# Patient Record
Sex: FEMALE | Race: BLACK OR AFRICAN AMERICAN | NOT HISPANIC OR LATINO | Employment: UNEMPLOYED | URBAN - METROPOLITAN AREA
[De-identification: names, ages, dates, MRNs, and addresses within clinical notes are randomized per-mention and may not be internally consistent; named-entity substitution may affect disease eponyms.]

---

## 2025-02-25 ENCOUNTER — APPOINTMENT (OUTPATIENT)
Dept: RADIOLOGY | Facility: HOSPITAL | Age: 45
End: 2025-02-25
Payer: COMMERCIAL

## 2025-02-25 ENCOUNTER — HOSPITAL ENCOUNTER (EMERGENCY)
Facility: HOSPITAL | Age: 45
Discharge: HOME | End: 2025-02-25
Attending: STUDENT IN AN ORGANIZED HEALTH CARE EDUCATION/TRAINING PROGRAM
Payer: COMMERCIAL

## 2025-02-25 VITALS
DIASTOLIC BLOOD PRESSURE: 75 MMHG | HEART RATE: 74 BPM | TEMPERATURE: 98.6 F | SYSTOLIC BLOOD PRESSURE: 120 MMHG | WEIGHT: 180 LBS | RESPIRATION RATE: 20 BRPM | OXYGEN SATURATION: 100 %

## 2025-02-25 DIAGNOSIS — R10.84 GENERALIZED ABDOMINAL PAIN: Primary | ICD-10-CM

## 2025-02-25 DIAGNOSIS — K52.9 GASTROENTERITIS: ICD-10-CM

## 2025-02-25 LAB
ALBUMIN SERPL BCP-MCNC: 4.9 G/DL (ref 3.4–5)
ALP SERPL-CCNC: 31 U/L (ref 33–110)
ALT SERPL W P-5'-P-CCNC: 13 U/L (ref 7–45)
ANION GAP SERPL CALC-SCNC: 13 MMOL/L (ref 10–20)
AST SERPL W P-5'-P-CCNC: 21 U/L (ref 9–39)
BASOPHILS # BLD AUTO: 0.04 X10*3/UL (ref 0–0.1)
BASOPHILS NFR BLD AUTO: 0.6 %
BILIRUB SERPL-MCNC: 0.4 MG/DL (ref 0–1.2)
BUN SERPL-MCNC: 19 MG/DL (ref 6–23)
CALCIUM SERPL-MCNC: 9.1 MG/DL (ref 8.6–10.3)
CHLORIDE SERPL-SCNC: 103 MMOL/L (ref 98–107)
CO2 SERPL-SCNC: 23 MMOL/L (ref 21–32)
CREAT SERPL-MCNC: 1.55 MG/DL (ref 0.5–1.05)
EGFRCR SERPLBLD CKD-EPI 2021: 43 ML/MIN/1.73M*2
EOSINOPHIL # BLD AUTO: 0.09 X10*3/UL (ref 0–0.7)
EOSINOPHIL NFR BLD AUTO: 1.2 %
ERYTHROCYTE [DISTWIDTH] IN BLOOD BY AUTOMATED COUNT: 16.8 % (ref 11.5–14.5)
GLUCOSE SERPL-MCNC: 102 MG/DL (ref 74–99)
HCT VFR BLD AUTO: 32.3 % (ref 36–46)
HGB BLD-MCNC: 10.3 G/DL (ref 12–16)
IMM GRANULOCYTES # BLD AUTO: 0.03 X10*3/UL (ref 0–0.7)
IMM GRANULOCYTES NFR BLD AUTO: 0.4 % (ref 0–0.9)
LIPASE SERPL-CCNC: 10 U/L (ref 9–82)
LYMPHOCYTES # BLD AUTO: 1.24 X10*3/UL (ref 1.2–4.8)
LYMPHOCYTES NFR BLD AUTO: 17.1 %
MCH RBC QN AUTO: 25.7 PG (ref 26–34)
MCHC RBC AUTO-ENTMCNC: 31.9 G/DL (ref 32–36)
MCV RBC AUTO: 81 FL (ref 80–100)
MONOCYTES # BLD AUTO: 0.39 X10*3/UL (ref 0.1–1)
MONOCYTES NFR BLD AUTO: 5.4 %
NEUTROPHILS # BLD AUTO: 5.46 X10*3/UL (ref 1.2–7.7)
NEUTROPHILS NFR BLD AUTO: 75.3 %
NRBC BLD-RTO: 0 /100 WBCS (ref 0–0)
PLATELET # BLD AUTO: 350 X10*3/UL (ref 150–450)
POTASSIUM SERPL-SCNC: 3.7 MMOL/L (ref 3.5–5.3)
PREGNANCY TEST URINE, POC: NEGATIVE
PROT SERPL-MCNC: 7.8 G/DL (ref 6.4–8.2)
RBC # BLD AUTO: 4.01 X10*6/UL (ref 4–5.2)
SODIUM SERPL-SCNC: 135 MMOL/L (ref 136–145)
WBC # BLD AUTO: 7.3 X10*3/UL (ref 4.4–11.3)

## 2025-02-25 PROCEDURE — 99285 EMERGENCY DEPT VISIT HI MDM: CPT | Mod: 25 | Performed by: STUDENT IN AN ORGANIZED HEALTH CARE EDUCATION/TRAINING PROGRAM

## 2025-02-25 PROCEDURE — 83690 ASSAY OF LIPASE: CPT

## 2025-02-25 PROCEDURE — 36415 COLL VENOUS BLD VENIPUNCTURE: CPT

## 2025-02-25 PROCEDURE — 85025 COMPLETE CBC W/AUTO DIFF WBC: CPT

## 2025-02-25 PROCEDURE — 84075 ASSAY ALKALINE PHOSPHATASE: CPT

## 2025-02-25 PROCEDURE — 2500000001 HC RX 250 WO HCPCS SELF ADMINISTERED DRUGS (ALT 637 FOR MEDICARE OP)

## 2025-02-25 PROCEDURE — 74177 CT ABD & PELVIS W/CONTRAST: CPT | Performed by: STUDENT IN AN ORGANIZED HEALTH CARE EDUCATION/TRAINING PROGRAM

## 2025-02-25 PROCEDURE — 81025 URINE PREGNANCY TEST: CPT

## 2025-02-25 PROCEDURE — 2550000001 HC RX 255 CONTRASTS: Performed by: STUDENT IN AN ORGANIZED HEALTH CARE EDUCATION/TRAINING PROGRAM

## 2025-02-25 PROCEDURE — 93005 ELECTROCARDIOGRAM TRACING: CPT

## 2025-02-25 PROCEDURE — 74177 CT ABD & PELVIS W/CONTRAST: CPT

## 2025-02-25 RX ORDER — ACETAMINOPHEN 500 MG
1000 TABLET ORAL EVERY 6 HOURS PRN
Qty: 30 TABLET | Refills: 0 | Status: SHIPPED | OUTPATIENT
Start: 2025-02-25 | End: 2025-03-07

## 2025-02-25 RX ORDER — ACETAMINOPHEN 325 MG/1
975 TABLET ORAL ONCE
Status: COMPLETED | OUTPATIENT
Start: 2025-02-25 | End: 2025-02-25

## 2025-02-25 RX ORDER — PNV NO.153/FA/OM3/DHA/EPA/FISH 400-35-25
20 TABLET,CHEWABLE ORAL 3 TIMES DAILY PRN
Qty: 355 ML | Refills: 0 | Status: SHIPPED | OUTPATIENT
Start: 2025-02-25

## 2025-02-25 RX ORDER — ALUMINUM HYDROXIDE, MAGNESIUM HYDROXIDE, AND SIMETHICONE 1200; 120; 1200 MG/30ML; MG/30ML; MG/30ML
20 SUSPENSION ORAL ONCE
Status: COMPLETED | OUTPATIENT
Start: 2025-02-25 | End: 2025-02-25

## 2025-02-25 RX ORDER — POLYETHYLENE GLYCOL 3350 17 G/17G
17 POWDER, FOR SOLUTION ORAL DAILY
Qty: 7 PACKET | Refills: 0 | Status: SHIPPED | OUTPATIENT
Start: 2025-02-25 | End: 2025-03-04

## 2025-02-25 RX ORDER — CALCIUM CARBONATE 200(500)MG
1000 TABLET,CHEWABLE ORAL ONCE
Status: COMPLETED | OUTPATIENT
Start: 2025-02-25 | End: 2025-02-25

## 2025-02-25 RX ADMIN — CALCIUM CARBONATE (ANTACID) CHEW TAB 500 MG 1000 MG: 500 CHEW TAB at 10:18

## 2025-02-25 RX ADMIN — ALUMINUM HYDROXIDE, MAGNESIUM HYDROXIDE, AND DIMETHICONE 20 ML: 200; 20; 200 SUSPENSION ORAL at 11:52

## 2025-02-25 RX ADMIN — IOHEXOL 75 ML: 350 INJECTION, SOLUTION INTRAVENOUS at 12:55

## 2025-02-25 RX ADMIN — ACETAMINOPHEN 975 MG: 325 TABLET, FILM COATED ORAL at 10:19

## 2025-02-25 ASSESSMENT — COLUMBIA-SUICIDE SEVERITY RATING SCALE - C-SSRS
1. IN THE PAST MONTH, HAVE YOU WISHED YOU WERE DEAD OR WISHED YOU COULD GO TO SLEEP AND NOT WAKE UP?: NO
6. HAVE YOU EVER DONE ANYTHING, STARTED TO DO ANYTHING, OR PREPARED TO DO ANYTHING TO END YOUR LIFE?: NO
2. HAVE YOU ACTUALLY HAD ANY THOUGHTS OF KILLING YOURSELF?: NO

## 2025-02-25 NOTE — ED TRIAGE NOTES
"Patient arrives via EMS to waiting room with complaints of abdominal burning that started this morning, denies n/v/d. States \"nothing makes it better or worse,but tylenol would probably help\"  "

## 2025-02-25 NOTE — ED PROVIDER NOTES
History of Present Illness     History provided by: Patient  Limitations to History: None  External Records Reviewed with Brief Summary:  Previous visits to emergency departments for relatively benign complaints, does not appear to have any specific abdominal history    HPI:  Deisi Leija is a 44 y.o. female who denies any medical history but does appear to have had previous prescriptions for levothyroxine and was previously diagnosed with PE as well as diagnosis of major depressive disorder for which she took warfarin but is no longer on it.  Now only taking melatonin millikelvin.  Coming in today due to abdominal pain/burning which began around 4 PM yesterday.  Patient states that she ate a fast food meal then developed a burning sensation which she states migrates around her abdomen but has not crept into her chest.  No shortness of breath.  Initially denied nausea and vomiting but later described nausea.  No changes in bowel movements or urine.  No dysuria.  No other sick symptoms/fevers, malaise, cough.  Denies any sick contacts    Physical Exam   Triage vitals:  T 36.5 °C (97.7 °F)  HR 70  /69  RR 18  O2 99 % None (Room air)    Physical Exam  Constitutional:       General: She is not in acute distress.     Appearance: She is well-developed. She is not ill-appearing.   HENT:      Head: Normocephalic and atraumatic.      Mouth/Throat:      Mouth: Mucous membranes are moist.      Pharynx: Oropharynx is clear.   Eyes:      Extraocular Movements: Extraocular movements intact.      Pupils: Pupils are equal, round, and reactive to light.   Cardiovascular:      Rate and Rhythm: Normal rate and regular rhythm.      Heart sounds: Normal heart sounds. No murmur heard.     No friction rub. No gallop.   Pulmonary:      Effort: Pulmonary effort is normal. No respiratory distress.      Breath sounds: Normal breath sounds. No stridor. No wheezing, rhonchi or rales.   Chest:      Chest wall: No tenderness.    Abdominal:      General: Abdomen is protuberant.      Palpations: Abdomen is soft.      Tenderness: There is generalized abdominal tenderness. There is no right CVA tenderness, left CVA tenderness, guarding or rebound. Negative signs include Meyer's sign, McBurney's sign, psoas sign and obturator sign.      Hernia: No hernia is present.   Skin:     General: Skin is warm and dry.      Capillary Refill: Capillary refill takes less than 2 seconds.   Neurological:      Mental Status: She is alert and oriented to person, place, and time.      Comments: Ambulates normally   Appropriate strength and sensation in the BUEs and BLEs   Psychiatric:         Mood and Affect: Mood normal.         Behavior: Behavior normal.          Medical Decision Making & ED Course   Medical Decision Makin y.o. female with above history and physical notable for largely nonspecific abdominal pain without explicit concerns.  History sounds consistent with GERD and patient reported significant reduction in abdominal pain within 30 minutes of taking Tylenol and Tums.  Later administered Maalox which was initially well-tolerated but patient then reported that she felt worse and appeared anxious.  Patient stated that she was concerned that there was something very wrong at this point.  I suspect that this was largely induced by anxiety and her discomfort in her room as her room was uncomfortably warm.  Because of this now worse feeling despite no change in her examination I opted for contrasted CT of the abdomen and pelvis which showed no acute findings.  There were some gallstones without any signs of cholecystitis or gallstones being found in the neck of the gallbladder and therefore inconsistent with acute biliary obstruction or infection.  No other acute processes were found although on my personal review to it did appear that there was some mild stool burden.  I explained that there were no acute findings on her CT and that there were  no further concerns for emergent processes in her abdomen.  Patient then reported that she was concerned that she was constipated though describes having a bowel movement only a couple hours prior to initial presentation at the ED today.  I educated patient on this and that this is likely inconsistent with a diagnosis of constipation, but given the stool burden visible on her CT I did offer that MiraLAX may help with having some easier bowel movements since patient was describing having a partial but incomplete bowel movement.  Patient asked me to prescribe this as well as the other treatments that were used in the emergency department for continued therapy.  I avoided NSAID given concern that there may be some component of gastric acid/reflux causing her symptoms and I do not want to contribute to the acidity of her stomach by using NSAIDs and putting her at greater risk of gastric ulcer especially if this becomes a chronic complaint.  Ultimately prescribed MiraLAX, Tylenol, Maalox, and Tums to control patient's symptoms which are likely mediated by gastric reflux.  There is some possible but extremely mild concern for gastric ulcer.  At this point I do not feel that this is likely at this time.  Patient may also be suffering from the symptoms of some possible gallbladder symptoms from compression around stones but stone burden does not appear to be significant enough to be causing significant levels of pain and there are no signs of acute obstruction in the biliary system.    No other acute signs of emergent process in the abdomen including appendicitis as all physical exam maneuvers and CT were negative for signs of this, no vomiting.  Patient has not been having diarrhea or bloody bowel movements.  I do have some concern that there is potential for some component of irritable bowel disease which may be contributing to overall patient condition but no evidence of this on CT so will not pursue referral to GI at  this point.  Patient had good response to symptomatic treatments and will be discharged in stable condition.     ED Course:   ED Course as of 02/26/25 1503   Tue Feb 25, 2025   1054 EKG obtained today interpreted by me shows a normal sinus rhythm with a ventricular rate of 61, upright axis with generally low voltage throughout.  NY interval is borderline elongated and appears to be consistent with first-degree AV block, QRS is narrow and QTc normal. No ST changes or signs of STEMI.  There are some T wave inversions notable in V3 and V4 which are seen in previous EKGs from September and October of 2011. [CJ]   1222 Attending summary:  43 y/o F with PMHx provoked PE no longer on anticoag who is presenting for abdominal pain.  She reports that started yesterday at rest, was initially diffuse but now on the left side of her abdomen after she received Tylenol and Tums earlier this ED visit.  It is constant, associated with nausea but has been tolerating p.o.  No vomiting, diarrhea, urinary symptoms, chest pain, shortness of breath, fevers, chills.  No prior abdominal surgeries.  Never had symptoms like this before.  She did state that after the abdominal pain started, she urgently had to have a bowel movement and it slightly improved her symptoms. No vaginal bleeding or discharge. Currently on menstrual cycle.  Vitals unremarkable.  Exam shows a fatigued but nontoxic-appearing female who has a soft, nontender abdomen.      Prior to my evaluation, patient had labs and pregnancy test that showed no leukocytosis, CKD with creatinine at baseline, normal lipase, and negative pregnancy test on my independent review.  She has no risk factors for SBO and her abdominal exam is reassuring.  Furthermore, low suspicion for appendicitis, cholecystitis, diverticulitis based on history and exam.  She has no  symptoms very low concern for ovarian pathology or PID.  It is possible that this is gastroenteritis or IBS.  I discussed this  with the patient, engaged in shared decision making regarding CT abdomen pelvis stating that it is still possible we might not have an answer after that, and she would like to proceed with CT.  Will provide continued pain control.  Dispo pending CT results but I anticipate discharge. [SS]   1314 Lab workup is largely unremarkable, creatinine is at baseline [CJ]      ED Course User Index  [CJ] Santiago Tomlinson MD  [SS] Suzan Lloyd MD         Diagnoses as of 02/26/25 1503   Generalized abdominal pain   Gastroenteritis         ---     Social Determinants of Health which Significantly Impact Care: None identified     EKG Independent Interpretation: EKG interpreted by myself. Please see ED Course for full interpretation.    The patient was discussed with the following consultants/services: None    Independent Result Review and Interpretation: Relevant laboratory and radiographic results were reviewed and independently interpreted by myself.  As necessary, they are commented on in the ED Course.    Chronic conditions affecting the patient's care: As documented above in MDM    Care Considerations: As documented above in MDM    Disposition   As a result of the work-up, the patient was discharged home.  she was informed of her diagnosis and instructed to come back with any concerns or worsening of condition.  she and was agreeable to the plan as discussed above.  she was given the opportunity to ask questions.  All of the patient's questions were answered.    Procedures   Procedures    This was a shared visit with an ED attending.  The patient was seen and discussed with the ED attending    Santiago Tomlinson MD  Emergency Medicine     Santiago Tomlinson MD  Resident  02/26/25 [id number]

## 2025-02-25 NOTE — DISCHARGE INSTRUCTIONS
You are seen in the emergency room for abdominal pain.  You stated that your pain is started to get better after taking Tylenol and Tums the suggest that your symptoms are likely due to a condition called gastroenteritis which can be caused by several things including foods that did not agree with you and infections with viruses such as the flu.  In the meantime it is very important that you continue to stay hydrated with water and other fluids such as Gatorade.   It also appears that you have some gallstones which may cause some pain but no signs of these gallstones causing any blockages.     I recommend that you continue to treat your symptoms at home and use medications like Tylenol and Tums.  Also if you felt that the liquid you received felt that would be another good treatment, it is called Mylanta and can be bought at a pharmacy as can tylenol and Tums    I am sending prescriptions for Mylanta, Tylenol, and Tums as these seem to help you while you are here in the emergency room, I am also going to write some MiraLAX for you as you mentioned that you are having some difficulty having a bowel movement while you were here

## 2025-02-28 LAB
ATRIAL RATE: 61 BPM
P AXIS: 65 DEGREES
P OFFSET: 178 MS
P ONSET: 119 MS
PR INTERVAL: 210 MS
Q ONSET: 224 MS
QRS COUNT: 10 BEATS
QRS DURATION: 74 MS
QT INTERVAL: 402 MS
QTC CALCULATION(BAZETT): 404 MS
QTC FREDERICIA: 404 MS
R AXIS: -26 DEGREES
T AXIS: 30 DEGREES
T OFFSET: 425 MS
VENTRICULAR RATE: 61 BPM